# Patient Record
Sex: FEMALE | Race: BLACK OR AFRICAN AMERICAN | NOT HISPANIC OR LATINO | Employment: FULL TIME | URBAN - METROPOLITAN AREA
[De-identification: names, ages, dates, MRNs, and addresses within clinical notes are randomized per-mention and may not be internally consistent; named-entity substitution may affect disease eponyms.]

---

## 2024-08-01 ENCOUNTER — LAB (OUTPATIENT)
Dept: LAB | Facility: LAB | Age: 24
End: 2024-08-01
Payer: COMMERCIAL

## 2024-08-01 ENCOUNTER — APPOINTMENT (OUTPATIENT)
Dept: PRIMARY CARE | Facility: CLINIC | Age: 24
End: 2024-08-01
Payer: COMMERCIAL

## 2024-08-01 VITALS
BODY MASS INDEX: 26.93 KG/M2 | HEIGHT: 63 IN | RESPIRATION RATE: 21 BRPM | OXYGEN SATURATION: 99 % | SYSTOLIC BLOOD PRESSURE: 123 MMHG | DIASTOLIC BLOOD PRESSURE: 73 MMHG | WEIGHT: 152 LBS | HEART RATE: 100 BPM

## 2024-08-01 DIAGNOSIS — Z00.00 ROUTINE GENERAL MEDICAL EXAMINATION AT A HEALTH CARE FACILITY: Primary | ICD-10-CM

## 2024-08-01 DIAGNOSIS — Z00.00 ROUTINE GENERAL MEDICAL EXAMINATION AT A HEALTH CARE FACILITY: ICD-10-CM

## 2024-08-01 LAB
ANION GAP SERPL CALC-SCNC: 12 MMOL/L (ref 10–20)
BUN SERPL-MCNC: 14 MG/DL (ref 6–23)
CALCIUM SERPL-MCNC: 9.4 MG/DL (ref 8.6–10.3)
CHLORIDE SERPL-SCNC: 108 MMOL/L (ref 98–107)
CO2 SERPL-SCNC: 23 MMOL/L (ref 21–32)
CREAT SERPL-MCNC: 0.8 MG/DL (ref 0.5–1.05)
EGFRCR SERPLBLD CKD-EPI 2021: >90 ML/MIN/1.73M*2
ERYTHROCYTE [DISTWIDTH] IN BLOOD BY AUTOMATED COUNT: 13.3 % (ref 11.5–14.5)
GLUCOSE SERPL-MCNC: 77 MG/DL (ref 74–99)
HCT VFR BLD AUTO: 38.7 % (ref 36–46)
HGB BLD-MCNC: 12 G/DL (ref 12–16)
MCH RBC QN AUTO: 25.3 PG (ref 26–34)
MCHC RBC AUTO-ENTMCNC: 31 G/DL (ref 32–36)
MCV RBC AUTO: 82 FL (ref 80–100)
NRBC BLD-RTO: 0 /100 WBCS (ref 0–0)
PLATELET # BLD AUTO: 333 X10*3/UL (ref 150–450)
POTASSIUM SERPL-SCNC: 4.3 MMOL/L (ref 3.5–5.3)
RBC # BLD AUTO: 4.75 X10*6/UL (ref 4–5.2)
SODIUM SERPL-SCNC: 139 MMOL/L (ref 136–145)
TSH SERPL-ACNC: 1.95 MIU/L (ref 0.44–3.98)
WBC # BLD AUTO: 8.8 X10*3/UL (ref 4.4–11.3)

## 2024-08-01 PROCEDURE — 85027 COMPLETE CBC AUTOMATED: CPT

## 2024-08-01 PROCEDURE — 99385 PREV VISIT NEW AGE 18-39: CPT | Performed by: STUDENT IN AN ORGANIZED HEALTH CARE EDUCATION/TRAINING PROGRAM

## 2024-08-01 PROCEDURE — 36415 COLL VENOUS BLD VENIPUNCTURE: CPT

## 2024-08-01 PROCEDURE — 80048 BASIC METABOLIC PNL TOTAL CA: CPT

## 2024-08-01 PROCEDURE — 3008F BODY MASS INDEX DOCD: CPT | Performed by: STUDENT IN AN ORGANIZED HEALTH CARE EDUCATION/TRAINING PROGRAM

## 2024-08-01 PROCEDURE — 1036F TOBACCO NON-USER: CPT | Performed by: STUDENT IN AN ORGANIZED HEALTH CARE EDUCATION/TRAINING PROGRAM

## 2024-08-01 PROCEDURE — 84443 ASSAY THYROID STIM HORMONE: CPT

## 2024-08-01 ASSESSMENT — PATIENT HEALTH QUESTIONNAIRE - PHQ9
2. FEELING DOWN, DEPRESSED OR HOPELESS: NOT AT ALL
SUM OF ALL RESPONSES TO PHQ9 QUESTIONS 1 AND 2: 0
1. LITTLE INTEREST OR PLEASURE IN DOING THINGS: NOT AT ALL

## 2024-08-01 ASSESSMENT — ENCOUNTER SYMPTOMS
OCCASIONAL FEELINGS OF UNSTEADINESS: 0
DEPRESSION: 0
LOSS OF SENSATION IN FEET: 0

## 2024-08-01 NOTE — PROGRESS NOTES
"  Subjective     Patient ID: Delilah Ovalles is a 24 y.o. female who presents for New Patient Visit.      Last Physical : ___Years ago     Pt's PMH, PSH, SH, FH , meds and allergies was obtained / reviewed and updated .     Dental visits : Y  Vision issues : N  Hearing issues : N    Immunizations : Y    Diet :    Exercise:  Weight concerns :     Alcohol: as noted in   Tobacco: as noted in   Recreational drug use : None/ as noted in     ======================================================    Visit Vitals  /73   Pulse 100   Resp 21   Ht 1.6 m (5' 3\")   Wt 68.9 kg (152 lb)   SpO2 99%   BMI 26.93 kg/m²   Smoking Status Never   BSA 1.75 m²      No LMP recorded.   No current outpatient medications   Social History     Tobacco Use    Smoking status: Never    Smokeless tobacco: Never   Substance Use Topics    Alcohol use: Yes     Comment: social        =====================================    Review of systems:  Constitutional: no chills, no fever and no night sweats.     Eyes: no blurred vision and no eyesight problems.     ENT: no hearing loss, no nasal congestion, no nasal discharge, no hoarseness and no sore throat.     Cardiovascular: no chest pain, no intermittent leg claudication, no lower extremity edema, no palpitations and no syncope.     Respiratory: no cough, no shortness of breath during exertion, no shortness of breath at rest and no wheezing.     Gastrointestinal: no abdominal pain, no blood in stools, no constipation, no diarrhea, no melena, no nausea, no rectal pain and no vomiting.     Genitourinary: no dysuria, no change in urinary frequency, no urinary hesitancy, no feelings of urinary urgency and no vaginal discharge.     Musculoskeletal: no arthralgias, no back pain and no myalgias.     Integumentary: no new skin lesions and no rashes.     Neurological: no difficulty walking, no headache, no limb weakness, no numbness and no tingling.     Psychiatric: no anxiety, no depression, no anhedonia " and no substance use disorders.   ============================================================    Physical exam :    Constitutional: Alert and in no acute distress. Well developed, well nourished.     Eyes: Normal external exam. Pupils were equal in size, round, reactive to light (PERRL) with normal accommodation and extraocular movements intact (EOMI).     Ears, Nose, Mouth, and Throat: External inspection of ears and nose: Normal.  Otoscopic examination: Normal.      Neck: No neck mass was observed. Supple.     Cardiovascular: Heart rate and rhythm were normal, normal S1 and S2, no gallops, no murmurs and no pericardial rub    Pulmonary: No respiratory distress. Clear bilateral breath sounds.     Abdomen: Soft nontender; no abdominal mass palpated. No organomegaly.     Musculoskeletal: No joint swelling seen, normal movements of all extremities. Range of motion: Normal.  Muscle strength/tone: Normal.      Neurologic: Deep tendon reflexes were 2+ and symmetric. Sensation: Normal.     Psychiatric: Judgment and insight: Intact. Mood and affect: Normal.        Assessment/Plan    Diagnoses and all orders for this visit:  Routine general medical examination at a health care facility  -     CBC; Future  -     Basic Metabolic Panel; Future  -     TSH with reflex to Free T4 if abnormal; Future        Drink atleast 64 oz of fluid everyday .   Include fiber in the diet  in the form of vegetables . Half of your plate should include vegetables         HM :   Vaccines:  -Tdap : last received 11/2021  -Flu :    -Shingles : at age 49     Cancer screenings:  -PAP :  to fu with gyn     General preventive care discussed which includes regular exercise, healthy eating habits, to include more of plant based diet, to include foods of all colors  , limiting excessive red meat intake , staying active to maintain a weight that is appropriate for his/ her height / making efforts to reach an ideal weight for height,  avoidance of smoking,  excess alcohol consumption, avoidance of recreational drugs, safe and responsible sexual relationships and practices.     Calcium + Vit D supplements recommended   Regular exercise recommended   Healthy eating choices discussed and recommended       Conditions addressed and mgmt as noted above.  Pertinent labs, images/ imaging reports , chart review was done .   Age appropriate labs / labs for mgmt of chronic medical conditions ordered, further mgmt pending the results.       This note is intended for the physician writing it, as well as to communicate findings to other healthcare professionals. These notes use medical lexicon that may be misunderstood by non medical persons. Therefore, interpretations of medical notes and terminology should be approached with caution.

## 2024-08-02 ENCOUNTER — LAB (OUTPATIENT)
Dept: LAB | Facility: LAB | Age: 24
End: 2024-08-02
Payer: COMMERCIAL

## 2024-08-02 ENCOUNTER — OFFICE VISIT (OUTPATIENT)
Dept: OBSTETRICS AND GYNECOLOGY | Facility: HOSPITAL | Age: 24
End: 2024-08-02
Payer: COMMERCIAL

## 2024-08-02 VITALS
BODY MASS INDEX: 27.11 KG/M2 | WEIGHT: 153 LBS | DIASTOLIC BLOOD PRESSURE: 82 MMHG | HEIGHT: 63 IN | SYSTOLIC BLOOD PRESSURE: 134 MMHG

## 2024-08-02 DIAGNOSIS — Z01.419 WELL WOMAN EXAM WITH ROUTINE GYNECOLOGICAL EXAM: Primary | ICD-10-CM

## 2024-08-02 DIAGNOSIS — Z01.419 WELL WOMAN EXAM WITH ROUTINE GYNECOLOGICAL EXAM: ICD-10-CM

## 2024-08-02 LAB
HBV SURFACE AG SERPL QL IA: NONREACTIVE
HCV AB SER QL: NONREACTIVE
HIV 1+2 AB+HIV1 P24 AG SERPL QL IA: NONREACTIVE
TREPONEMA PALLIDUM IGG+IGM AB [PRESENCE] IN SERUM OR PLASMA BY IMMUNOASSAY: NONREACTIVE

## 2024-08-02 PROCEDURE — 99385 PREV VISIT NEW AGE 18-39: CPT | Performed by: NURSE PRACTITIONER

## 2024-08-02 PROCEDURE — 86803 HEPATITIS C AB TEST: CPT

## 2024-08-02 PROCEDURE — 87340 HEPATITIS B SURFACE AG IA: CPT

## 2024-08-02 PROCEDURE — 87389 HIV-1 AG W/HIV-1&-2 AB AG IA: CPT

## 2024-08-02 PROCEDURE — 1036F TOBACCO NON-USER: CPT | Performed by: NURSE PRACTITIONER

## 2024-08-02 PROCEDURE — 86780 TREPONEMA PALLIDUM: CPT

## 2024-08-02 PROCEDURE — 36415 COLL VENOUS BLD VENIPUNCTURE: CPT

## 2024-08-02 PROCEDURE — 3008F BODY MASS INDEX DOCD: CPT | Performed by: NURSE PRACTITIONER

## 2024-08-02 SDOH — ECONOMIC STABILITY: FOOD INSECURITY: WITHIN THE PAST 12 MONTHS, THE FOOD YOU BOUGHT JUST DIDN'T LAST AND YOU DIDN'T HAVE MONEY TO GET MORE.: NEVER TRUE

## 2024-08-02 SDOH — ECONOMIC STABILITY: TRANSPORTATION INSECURITY
IN THE PAST 12 MONTHS, HAS THE LACK OF TRANSPORTATION KEPT YOU FROM MEDICAL APPOINTMENTS OR FROM GETTING MEDICATIONS?: NO

## 2024-08-02 SDOH — ECONOMIC STABILITY: FOOD INSECURITY: WITHIN THE PAST 12 MONTHS, YOU WORRIED THAT YOUR FOOD WOULD RUN OUT BEFORE YOU GOT MONEY TO BUY MORE.: NEVER TRUE

## 2024-08-02 SDOH — ECONOMIC STABILITY: TRANSPORTATION INSECURITY
IN THE PAST 12 MONTHS, HAS LACK OF TRANSPORTATION KEPT YOU FROM MEETINGS, WORK, OR FROM GETTING THINGS NEEDED FOR DAILY LIVING?: NO

## 2024-08-02 ASSESSMENT — PATIENT HEALTH QUESTIONNAIRE - PHQ9
1. LITTLE INTEREST OR PLEASURE IN DOING THINGS: NOT AT ALL
2. FEELING DOWN, DEPRESSED OR HOPELESS: NOT AT ALL
SUM OF ALL RESPONSES TO PHQ9 QUESTIONS 1 & 2: 0

## 2024-08-02 ASSESSMENT — ENCOUNTER SYMPTOMS
OCCASIONAL FEELINGS OF UNSTEADINESS: 0
DEPRESSION: 0
LOSS OF SENSATION IN FEET: 0

## 2024-08-02 ASSESSMENT — PAIN SCALES - GENERAL: PAINLEVEL: 0-NO PAIN

## 2024-08-05 LAB
C TRACH RRNA SPEC QL NAA+PROBE: NEGATIVE
N GONORRHOEA DNA SPEC QL PROBE+SIG AMP: NEGATIVE
T VAGINALIS RRNA SPEC QL NAA+PROBE: NEGATIVE

## 2024-08-08 LAB
CYTOLOGY CMNT CVX/VAG CYTO-IMP: NORMAL
LAB AP HPV HR: NORMAL
LAB AP PAP ADDITIONAL TESTS: NORMAL
LABORATORY COMMENT REPORT: NORMAL
LABORATORY COMMENT REPORT: NORMAL
LMP START DATE: NORMAL
PATH REPORT.TOTAL CANCER: NORMAL

## 2025-02-12 ENCOUNTER — OFFICE VISIT (OUTPATIENT)
Dept: OBSTETRICS AND GYNECOLOGY | Facility: HOSPITAL | Age: 25
End: 2025-02-12
Payer: COMMERCIAL

## 2025-02-12 VITALS
WEIGHT: 153 LBS | SYSTOLIC BLOOD PRESSURE: 124 MMHG | HEIGHT: 63 IN | HEART RATE: 101 BPM | DIASTOLIC BLOOD PRESSURE: 84 MMHG | BODY MASS INDEX: 27.11 KG/M2

## 2025-02-12 DIAGNOSIS — N89.8 VAGINAL DISCHARGE: Primary | ICD-10-CM

## 2025-02-12 DIAGNOSIS — N30.01 ACUTE CYSTITIS WITH HEMATURIA: ICD-10-CM

## 2025-02-12 PROBLEM — Z34.92 ENCOUNTER FOR PREGNANCY RELATED EXAMINATION IN SECOND TRIMESTER: Status: ACTIVE | Noted: 2025-02-12

## 2025-02-12 LAB
POC APPEARANCE, URINE: CLEAR
POC BILIRUBIN, URINE: NEGATIVE
POC BLOOD, URINE: NEGATIVE
POC COLOR, URINE: YELLOW
POC GLUCOSE, URINE: NEGATIVE MG/DL
POC KETONES, URINE: NEGATIVE MG/DL
POC LEUKOCYTES, URINE: NEGATIVE
POC NITRITE,URINE: POSITIVE
POC PH, URINE: 6 PH
POC PROTEIN, URINE: ABNORMAL MG/DL
POC SPECIFIC GRAVITY, URINE: >=1.03
POC UROBILINOGEN, URINE: 1 EU/DL

## 2025-02-12 PROCEDURE — 99214 OFFICE O/P EST MOD 30 MIN: CPT | Performed by: NURSE PRACTITIONER

## 2025-02-12 PROCEDURE — 1036F TOBACCO NON-USER: CPT | Performed by: NURSE PRACTITIONER

## 2025-02-12 PROCEDURE — 3008F BODY MASS INDEX DOCD: CPT | Performed by: NURSE PRACTITIONER

## 2025-02-12 PROCEDURE — 81003 URINALYSIS AUTO W/O SCOPE: CPT | Performed by: NURSE PRACTITIONER

## 2025-02-12 RX ORDER — SULFAMETHOXAZOLE AND TRIMETHOPRIM 800; 160 MG/1; MG/1
1 TABLET ORAL 2 TIMES DAILY
Qty: 10 TABLET | Refills: 0 | Status: SHIPPED | OUTPATIENT
Start: 2025-02-12 | End: 2025-02-17

## 2025-02-12 SDOH — ECONOMIC STABILITY: FOOD INSECURITY: WITHIN THE PAST 12 MONTHS, YOU WORRIED THAT YOUR FOOD WOULD RUN OUT BEFORE YOU GOT MONEY TO BUY MORE.: NEVER TRUE

## 2025-02-12 SDOH — ECONOMIC STABILITY: FOOD INSECURITY: WITHIN THE PAST 12 MONTHS, THE FOOD YOU BOUGHT JUST DIDN'T LAST AND YOU DIDN'T HAVE MONEY TO GET MORE.: NEVER TRUE

## 2025-02-12 ASSESSMENT — PATIENT HEALTH QUESTIONNAIRE - PHQ9
2. FEELING DOWN, DEPRESSED OR HOPELESS: NOT AT ALL
1. LITTLE INTEREST OR PLEASURE IN DOING THINGS: NOT AT ALL
SUM OF ALL RESPONSES TO PHQ9 QUESTIONS 1 & 2: 0

## 2025-02-12 ASSESSMENT — ENCOUNTER SYMPTOMS: FREQUENCY: 1

## 2025-02-12 ASSESSMENT — PAIN SCALES - GENERAL: PAINLEVEL_OUTOF10: 0-NO PAIN

## 2025-02-12 NOTE — PROGRESS NOTES
Subjective   Patient ID: Delilah Ovalles is a 24 y.o. female who presents for Vaginitis/Bacterial Vaginosis (Pt has recurring BV/Possible UTI/Last pap 2024 neg/Declines STI testing/Pt declines chaperone/[facility] MA).  HPI  Patient is a 24-year-old  here today complaining of a foul smell to her urine along with darkened color.  Denies any dysuria.  Reports she has had urinary tract infection before and feels like this is the same.    Also believes she has bacterial vaginosis.    Also reports she still is trying for pregnancy although does not track her periods and focusing sex at ovulation time.  Review of Systems   Genitourinary:  Positive for frequency and vaginal discharge.   All other systems reviewed and are negative.      Objective   Physical Exam  Constitutional:       Appearance: She is normal weight.   Pulmonary:      Effort: Pulmonary effort is normal.   Genitourinary:     General: Normal vulva.      Labia:         Right: No rash or tenderness.         Left: No rash or tenderness.       Vagina: Vaginal discharge present.   Musculoskeletal:      Cervical back: Normal range of motion.   Neurological:      Mental Status: She is alert.   Psychiatric:         Mood and Affect: Mood normal.         Assessment/Plan   Problem List Items Addressed This Visit             ICD-10-CM       Ob-Gyn Problems    Encounter for pregnancy related examination in second trimester Z34.92    Relevant Medications    sulfamethoxazole-trimethoprim (Bactrim DS) 800-160 mg tablet     Other Visit Diagnoses         Codes    Vaginal discharge    -  Primary N89.8    Relevant Orders    Vaginitis Gram Stain For Bacterial Vaginosis + Yeast        Reviewed tracking.  And focusing intercourse at ovulatory time.  Treating apparent urinary tract infection with culture pending.  Will wait for vaginitis results before treating.         Kristen Dill, SERENITY-CNP 25 10:05 AM

## 2025-02-15 LAB
BACTERIA UR CULT: ABNORMAL
BV SCORE VAG QL: NORMAL

## 2025-04-17 ENCOUNTER — OFFICE VISIT (OUTPATIENT)
Dept: URGENT CARE | Age: 25
End: 2025-04-17
Payer: COMMERCIAL

## 2025-04-17 VITALS
TEMPERATURE: 98.1 F | HEART RATE: 83 BPM | BODY MASS INDEX: 26.58 KG/M2 | DIASTOLIC BLOOD PRESSURE: 87 MMHG | HEIGHT: 63 IN | SYSTOLIC BLOOD PRESSURE: 133 MMHG | RESPIRATION RATE: 18 BRPM | OXYGEN SATURATION: 96 % | WEIGHT: 150 LBS

## 2025-04-17 DIAGNOSIS — N76.0 BACTERIAL VAGINOSIS: ICD-10-CM

## 2025-04-17 DIAGNOSIS — N89.8 VAGINAL IRRITATION: ICD-10-CM

## 2025-04-17 DIAGNOSIS — R35.0 FREQUENT URINATION: ICD-10-CM

## 2025-04-17 DIAGNOSIS — R30.0 DYSURIA: Primary | ICD-10-CM

## 2025-04-17 DIAGNOSIS — B96.89 BACTERIAL VAGINOSIS: ICD-10-CM

## 2025-04-17 LAB
POC APPEARANCE, URINE: CLEAR
POC BACTERIAL VAGINITIS (RAPID): POSITIVE
POC BILIRUBIN, URINE: ABNORMAL
POC BLOOD, URINE: ABNORMAL
POC COLOR, URINE: YELLOW
POC GLUCOSE, URINE: ABNORMAL MG/DL
POC KETONES, URINE: NEGATIVE MG/DL
POC LEUKOCYTES, URINE: NEGATIVE
POC NITRITE,URINE: NEGATIVE
POC PH, URINE: 6.5 PH
POC PROTEIN, URINE: ABNORMAL MG/DL
POC SPECIFIC GRAVITY, URINE: 1.02
POC UROBILINOGEN, URINE: 0.2 EU/DL
PREGNANCY TEST URINE, POC: NEGATIVE

## 2025-04-17 RX ORDER — METRONIDAZOLE 7.5 MG/G
GEL VAGINAL 2 TIMES DAILY
Qty: 70 G | Refills: 0 | Status: SHIPPED | OUTPATIENT
Start: 2025-04-17 | End: 2025-04-22

## 2025-04-17 ASSESSMENT — ENCOUNTER SYMPTOMS: DYSURIA: 1

## 2025-04-17 NOTE — PROGRESS NOTES
"Subjective   Patient ID: Delilah Ovalles is a 24 y.o. female. They present today with a chief complaint of UTI (Frequency and burning with urination. Symptoms for a week.  ).    History of Present Illness    UTI      Past Medical History  Allergies as of 04/17/2025    (No Known Allergies)       Prescriptions Prior to Admission[1]     Medical History[2]    Surgical History[3]     reports that she has never smoked. She has never been exposed to tobacco smoke. She has never used smokeless tobacco. She reports current alcohol use. She reports that she does not use drugs.    Review of Systems  Review of Systems   Genitourinary:  Positive for dysuria and urgency.   All other systems reviewed and are negative.                                 Objective    Vitals:    04/17/25 1204   BP: 133/87   BP Location: Left arm   Patient Position: Sitting   BP Cuff Size: Adult   Pulse: 83   Resp: 18   Temp: 36.7 °C (98.1 °F)   TempSrc: Oral   SpO2: 96%   Weight: 68 kg (150 lb)   Height: 1.6 m (5' 3\")     Patient's last menstrual period was 04/07/2025 (approximate).    Physical Exam  Constitutional:       Appearance: Normal appearance.   HENT:      Head: Normocephalic.      Nose: Nose normal.      Mouth/Throat:      Mouth: Mucous membranes are dry.      Pharynx: Oropharynx is clear.   Eyes:      Extraocular Movements: Extraocular movements intact.      Pupils: Pupils are equal, round, and reactive to light.   Pulmonary:      Effort: Pulmonary effort is normal.   Abdominal:      General: There is no distension.      Tenderness: There is no abdominal tenderness. There is no right CVA tenderness or left CVA tenderness.   Musculoskeletal:         General: Normal range of motion.      Cervical back: Normal range of motion and neck supple.   Lymphadenopathy:      Cervical: No cervical adenopathy.   Skin:     General: Skin is warm and dry.   Neurological:      General: No focal deficit present.      Mental Status: She is alert and oriented to " person, place, and time.   Psychiatric:         Mood and Affect: Mood normal.         Behavior: Behavior normal.       Procedures    Point of Care Test & Imaging Results from this visit  Results for orders placed or performed in visit on 04/17/25   POCT UA Automated manually resulted   Result Value Ref Range    POC Color, Urine Yellow Straw, Yellow, Light-Yellow    POC Appearance, Urine Clear Clear    POC Glucose, Urine 500 (3+) (A) NEGATIVE mg/dl    POC Bilirubin, Urine SMALL (1+) (A) NEGATIVE    POC Ketones, Urine NEGATIVE NEGATIVE mg/dl    POC Specific Gravity, Urine 1.025 1.005 - 1.035    POC Blood, Urine SMALL (1+) (A) NEGATIVE    POC PH, Urine 6.5 No Reference Range Established PH    POC Protein, Urine TRACE (A) NEGATIVE mg/dl    POC Urobilinogen, Urine 0.2 0.2, 1.0 EU/DL    Poc Nitrite, Urine NEGATIVE NEGATIVE    POC Leukocytes, Urine NEGATIVE NEGATIVE   POCT pregnancy, urine manually resulted   Result Value Ref Range    Preg Test, Ur Negative Negative      Imaging  No results found.    Cardiology, Vascular, and Other Imaging  No other imaging results found for the past 2 days      Diagnostic study results (if any) were reviewed by SYLVESTER Rhoades.    Assessment/Plan   Allergies, medications, history, and pertinent labs/EKGs/Imaging reviewed by SYLVESTER Rhoades.     Medical Decision Making  25 y/o F presents with UTI s/s, pt was treated 2/12/25 for UTI with bactrim. Pt had s/s for 5 days with vaginal irritation.,    UA- trace protein and blood  HCG- NEG  Urine culture-PENDING  Rapid BV-POSITIVE; metrogel prescribed  Advised to refrain from sex, no hot tubs, no douching  F/u OB/GN  If urine culture is + will treat accordingly  If s/s worsen, go to ER        Follow up Care: Pt instructed to follow-up with PCP or other appropriate clinician within 24 to 48 hours. Report to ED if there is any development in worsening pain, difficulty swallowing, change in phonation, fever, chills, neck pain,  photophobia, headache, neck stiffness, chest pain, abdominal pain, vomiting, syncope, hemoptysis, leg swelling SOB, fever, facial swelling, eye pain, periorbital swelling/erythema, or any new signs or sx.     The patient was educated regarding diagnosis, supportive care, OTC and Rx medications. The patient was given the opportunity to ask questions prior to discharge. They verbalized understanding of my discussion of the plans for treatment, expected course, indications to return to  or seek further evaluation in ED, and the need for timely follow up as directed.     Take all the medicine you were prescribed, even if you feel better. Not finishing the medicine can make the infection come back. It may also make a future infection harder to treat.  Unless told not to by your healthcare provider, drink 8 to 12 glasses of fluid every day. Clear fluids, such as water, are best. This may help flush the infection from your system.  Please consider D-mannose supplementation if you continue to get recurrent UTIs as it may help prevent against gram negative bacteria such as E. coli  Preventing future infection  Keep your genital area clean. Use mild soap. Rinse with water.  If you are a woman, always wipe the genital area from front to back.  Urinate frequently. Don't hold urine in your bladder for a long time.  Always urinate after having sex.  Practice safe sex. Protect yourself and your partner from sexually transmitted infections (STIs).    Follow-up care  Follow up with your healthcare provider, or as advised. And see your healthcare provider for regular lab tests as directed.      Call 911 or go to ER if you have    Decreased urine output or trouble urinating  Severe pain in the lower back or flank  Fever of 100.4°F (38°C) or higher, or as directed by your healthcare provider  Shaking chills  Vomiting  Blood in your urine  Dark-colored or foul-smelling urine  Nausea or other problems that prevent you from taking your  prescribed medicine  New or worsening symptoms      Orders and Diagnoses  Diagnoses and all orders for this visit:  Dysuria  -     POCT pregnancy, urine manually resulted  -     Urine Culture  -     POCT BV Blue Rapid - Bacterial Vaginitis manually resulted  Frequent urination  -     POCT UA Automated manually resulted  -     POCT pregnancy, urine manually resulted  -     Urine Culture  Vaginal irritation  -     POCT BV Blue Rapid - Bacterial Vaginitis manually resulted  Bacterial vaginosis  -     metroNIDAZOLE (Metrogel) 0.75 % (37.5mg/5 gram) vaginal gel; Insert into the vagina 2 times a day for 5 days.      Medical Admin Record      Patient disposition: Home    Electronically signed by SYLVESTER Rhoades  12:42 PM           [1] (Not in a hospital admission)   [2]   Past Medical History:  Diagnosis Date    Acute vaginitis 09/03/2021    Acute vaginitis    Acute vaginitis 06/29/2021    Bacterial vaginosis    Chlamydial infection, unspecified 08/27/2020    Chlamydial infection    Personal history of other diseases of the respiratory system 08/26/2016    History of extrinsic asthma    Personal history of other infectious and parasitic diseases 01/25/2022    History of candidiasis of vagina    Personal history of other infectious and parasitic diseases 12/08/2021    History of trichomonal vaginitis    Personal history of other infectious and parasitic diseases 01/26/2021    History of gonorrhea    Urinary tract infection, site not specified 12/11/2021    Urinary tract infection, acute   [3] History reviewed. No pertinent surgical history.

## 2025-04-19 LAB — BACTERIA UR CULT: ABNORMAL

## 2025-05-15 ENCOUNTER — APPOINTMENT (OUTPATIENT)
Dept: PRIMARY CARE | Facility: CLINIC | Age: 25
End: 2025-05-15
Payer: COMMERCIAL

## 2025-06-19 ENCOUNTER — APPOINTMENT (OUTPATIENT)
Dept: PRIMARY CARE | Facility: CLINIC | Age: 25
End: 2025-06-19
Payer: COMMERCIAL

## 2025-07-10 ENCOUNTER — OFFICE VISIT (OUTPATIENT)
Dept: URGENT CARE | Age: 25
End: 2025-07-10
Payer: COMMERCIAL

## 2025-07-10 VITALS
WEIGHT: 150 LBS | DIASTOLIC BLOOD PRESSURE: 76 MMHG | TEMPERATURE: 98.9 F | RESPIRATION RATE: 16 BRPM | HEART RATE: 88 BPM | OXYGEN SATURATION: 98 % | SYSTOLIC BLOOD PRESSURE: 126 MMHG | BODY MASS INDEX: 26.58 KG/M2 | HEIGHT: 63 IN

## 2025-07-10 DIAGNOSIS — R35.0 FREQUENT URINATION: ICD-10-CM

## 2025-07-10 DIAGNOSIS — Z34.90 PREGNANCY, UNSPECIFIED GESTATIONAL AGE (HHS-HCC): Primary | ICD-10-CM

## 2025-07-10 LAB
POC APPEARANCE, URINE: ABNORMAL
POC BILIRUBIN, URINE: NEGATIVE
POC BLOOD, URINE: NEGATIVE
POC COLOR, URINE: YELLOW
POC GLUCOSE, URINE: NEGATIVE MG/DL
POC KETONES, URINE: NEGATIVE MG/DL
POC LEUKOCYTES, URINE: ABNORMAL
POC NITRITE,URINE: NEGATIVE
POC PH, URINE: 6.5 PH
POC PROTEIN, URINE: ABNORMAL MG/DL
POC SPECIFIC GRAVITY, URINE: 1.01
POC UROBILINOGEN, URINE: 0.2 EU/DL
PREGNANCY TEST URINE, POC: POSITIVE

## 2025-07-10 ASSESSMENT — ENCOUNTER SYMPTOMS: DYSURIA: 1

## 2025-07-10 NOTE — PROGRESS NOTES
"Subjective   Patient ID: Delilah Ovalles is a 25 y.o. female. They present today with a chief complaint of UTI (Cloudy urine, with frequency. Symptoms for a week. ).    History of Present Illness    UTI      Past Medical History  Allergies as of 07/10/2025    (No Known Allergies)       Prescriptions Prior to Admission[1]     Medical History[2]    Surgical History[3]     reports that she has never smoked. She has never been exposed to tobacco smoke. She has never used smokeless tobacco. She reports current alcohol use. She reports that she does not use drugs.    Review of Systems  Review of Systems   Genitourinary:  Positive for dysuria and urgency.   All other systems reviewed and are negative.                                 Objective    Vitals:    07/10/25 1051   BP: 126/76   BP Location: Left arm   Patient Position: Sitting   BP Cuff Size: Adult   Pulse: 88   Resp: 16   Temp: 37.2 °C (98.9 °F)   TempSrc: Oral   SpO2: 98%   Weight: 68 kg (150 lb)   Height: 1.6 m (5' 3\")     Patient's last menstrual period was 06/21/2025 (approximate).    Physical Exam  Vitals and nursing note reviewed.   Constitutional:       Appearance: Normal appearance.   HENT:      Head: Normocephalic.      Nose: Nose normal.      Mouth/Throat:      Mouth: Mucous membranes are dry.      Pharynx: Oropharynx is clear.   Eyes:      Extraocular Movements: Extraocular movements intact.      Pupils: Pupils are equal, round, and reactive to light.   Pulmonary:      Effort: Pulmonary effort is normal.   Abdominal:      General: There is no distension.      Tenderness: There is no abdominal tenderness. There is no right CVA tenderness or left CVA tenderness.   Musculoskeletal:         General: Normal range of motion.      Cervical back: Normal range of motion and neck supple.   Lymphadenopathy:      Cervical: No cervical adenopathy.   Skin:     General: Skin is warm and dry.   Neurological:      General: No focal deficit present.      Mental Status: She " is alert and oriented to person, place, and time.   Psychiatric:         Mood and Affect: Mood normal.         Behavior: Behavior normal.       Procedures    Point of Care Test & Imaging Results from this visit  Results for orders placed or performed in visit on 07/10/25   POCT pregnancy, urine manually resulted   Result Value Ref Range    Preg Test, Ur Positive (A) Negative   POCT UA Automated manually resulted   Result Value Ref Range    POC Color, Urine Yellow Straw, Yellow, Light-Yellow    POC Appearance, Urine Cloudy (A) Clear    POC Glucose, Urine NEGATIVE NEGATIVE mg/dl    POC Bilirubin, Urine NEGATIVE NEGATIVE    POC Ketones, Urine NEGATIVE NEGATIVE mg/dl    POC Specific Gravity, Urine 1.015 1.005 - 1.035    POC Blood, Urine NEGATIVE NEGATIVE    POC PH, Urine 6.5 No Reference Range Established PH    POC Protein, Urine TRACE (A) NEGATIVE mg/dl    POC Urobilinogen, Urine 0.2 0.2, 1.0 EU/DL    Poc Nitrite, Urine NEGATIVE NEGATIVE    POC Leukocytes, Urine SMALL (1+) (A) NEGATIVE      Imaging  No results found.    Cardiology, Vascular, and Other Imaging  No other imaging results found for the past 2 days      Diagnostic study results (if any) were reviewed by SYLVESTER Rhoades.    Assessment/Plan   Allergies, medications, history, and pertinent labs/EKGs/Imaging reviewed by SYLVESTER Rhoades.     Medical Decision Making  26 y/o F presents with urinary urgency and frequency x 1 week, LMP 6/14/25. Denies back pain and fever or ab pain. Patient denies headache, lightheadedness, dizziness, extremity weakness, numbness or tingling, chest pain, orthopnea, shortness of breath, wheezing, cough, abdominal pain, vomiting, diarrhea, constipation, or urinary symptoms.     Declined STI testing  UA- +leuks, protein  PCR- PEND  HCG- POSITIVE    Will wait to treat if true UTI d/t +HCG! Information given to pt regarding care, and [place] [facility] [place]  Refrain from sexual activity  Pelvic  rest  Cranberry supplementation daily  F/u PCP 1 week   If s/s worsen go to ER    At time of discharge, patient was clinically well-appearing and appropriate for outpatient management. The patient/parent/guardian was educated regarding diagnosis, supportive care, OTC and Rx medications. The patient/parent/guardian was given the opportunity to ask questions prior to discharge. They verbalized understanding of discussion of treatment plan, expected course of illness and/or injury, indications on when to return to , when to seek further evaluation in ED/call 911, and the need to follow up with PCP and/or specialist as referred. Patient/parent/guardian was provided with work/school documentation if requested. Patient stable upon discharge     Follow up Care: Pt instructed to follow-up with PCP or other appropriate clinician within 24 to 48 hours. Report to ED if there is any development in worsening pain, difficulty swallowing, change in phonation, fever, chills, neck pain, photophobia, headache, neck stiffness, chest pain, abdominal pain, vomiting, syncope, hemoptysis, leg swelling SOB, fever, facial swelling, eye pain, periorbital swelling/erythema, or any new signs or sx.     The patient was educated regarding diagnosis, supportive care, OTC and Rx medications. The patient was given the opportunity to ask questions prior to discharge. They verbalized understanding of my discussion of the plans for treatment, expected course, indications to return to  or seek further evaluation in ED, and the need for timely follow up as directed.       Take all the medicine you were prescribed, even if you feel better. Not finishing the medicine can make the infection come back. It may also make a future infection harder to treat.  Unless told not to by your healthcare provider, drink 8 to 12 glasses of fluid every day. Clear fluids, such as water, are best. This may help flush the infection from your system.  Please consider  D-mannose supplementation if you continue to get recurrent UTIs as it may help prevent against gram negative bacteria such as E. coli  Preventing future infection  Keep your genital area clean. Use mild soap. Rinse with water.  If you are a woman, always wipe the genital area from front to back.  Urinate frequently. Don't hold urine in your bladder for a long time.  Always urinate after having sex.  Practice safe sex. Protect yourself and your partner from sexually transmitted infections (STIs).    Follow-up care  Follow up with your healthcare provider, or as advised. And see your healthcare provider for regular lab tests as directed.      Call 911 or go to ER if you have    Decreased urine output or trouble urinating  Severe pain in the lower back or flank  Fever of 100.4°F (38°C) or higher, or as directed by your healthcare provider  Shaking chills  Vomiting  Blood in your urine  Dark-colored or foul-smelling urine  Nausea or other problems that prevent you from taking your prescribed medicine  New or worsening symptoms      Orders and Diagnoses  Diagnoses and all orders for this visit:  Pregnancy, unspecified gestational age ([facility])  -     Referral to Obstetrics; Future  -     Urine Culture  Frequent urination  -     POCT pregnancy, urine manually resulted  -     POCT UA Automated manually resulted  -     Urine Culture      Medical Admin Record      Patient disposition: Home    Electronically signed by SYLVESTER Rhoades  1:14 PM             [1] (Not in a hospital admission)   [2]   Past Medical History:  Diagnosis Date    Acute vaginitis 09/03/2021    Acute vaginitis    Acute vaginitis 06/29/2021    Bacterial vaginosis    Chlamydial infection, unspecified 08/27/2020    Chlamydial infection    Personal history of other diseases of the respiratory system 08/26/2016    History of extrinsic asthma    Personal history of other infectious and parasitic diseases 01/25/2022    History of candidiasis of vagina     Personal history of other infectious and parasitic diseases 12/08/2021    History of trichomonal vaginitis    Personal history of other infectious and parasitic diseases 01/26/2021    History of gonorrhea    Urinary tract infection, site not specified 12/11/2021    Urinary tract infection, acute   [3] No past surgical history on file.

## 2025-07-12 LAB — BACTERIA UR CULT: NORMAL

## 2025-07-18 ENCOUNTER — INITIAL PRENATAL (OUTPATIENT)
Dept: OBSTETRICS AND GYNECOLOGY | Facility: CLINIC | Age: 25
End: 2025-07-18
Payer: COMMERCIAL

## 2025-07-18 VITALS
SYSTOLIC BLOOD PRESSURE: 136 MMHG | BODY MASS INDEX: 27.35 KG/M2 | WEIGHT: 154.4 LBS | DIASTOLIC BLOOD PRESSURE: 81 MMHG | HEART RATE: 118 BPM

## 2025-07-18 DIAGNOSIS — Z34.01 PRENATAL CARE, FIRST PREGNANCY IN FIRST TRIMESTER: Primary | ICD-10-CM

## 2025-07-18 PROCEDURE — 0500F INITIAL PRENATAL CARE VISIT: CPT

## 2025-07-18 ASSESSMENT — ENCOUNTER SYMPTOMS
HEMATOLOGIC/LYMPHATIC NEGATIVE: 0
NEUROLOGICAL NEGATIVE: 0
PSYCHIATRIC NEGATIVE: 0
RESPIRATORY NEGATIVE: 0
GASTROINTESTINAL NEGATIVE: 0
ALLERGIC/IMMUNOLOGIC NEGATIVE: 0
MUSCULOSKELETAL NEGATIVE: 0
CONSTITUTIONAL NEGATIVE: 0
ENDOCRINE NEGATIVE: 0
EYES NEGATIVE: 0
CARDIOVASCULAR NEGATIVE: 0

## 2025-07-18 ASSESSMENT — EDINBURGH POSTNATAL DEPRESSION SCALE (EPDS)
TOTAL SCORE: 5
I HAVE LOOKED FORWARD WITH ENJOYMENT TO THINGS: AS MUCH AS I EVER DID
THINGS HAVE BEEN GETTING ON TOP OF ME: NO, MOST OF THE TIME I HAVE COPED QUITE WELL
I HAVE BLAMED MYSELF UNNECESSARILY WHEN THINGS WENT WRONG: NOT VERY OFTEN
I HAVE BEEN SO UNHAPPY THAT I HAVE HAD DIFFICULTY SLEEPING: NOT AT ALL
I HAVE BEEN SO UNHAPPY THAT I HAVE BEEN CRYING: NO, NEVER
I HAVE BEEN ABLE TO LAUGH AND SEE THE FUNNY SIDE OF THINGS: AS MUCH AS I ALWAYS COULD
I HAVE BEEN ANXIOUS OR WORRIED FOR NO GOOD REASON: YES, SOMETIMES
I HAVE FELT SCARED OR PANICKY FOR NO GOOD REASON: NO, NOT MUCH
THE THOUGHT OF HARMING MYSELF HAS OCCURRED TO ME: NEVER
I HAVE FELT SAD OR MISERABLE: NO, NOT AT ALL

## 2025-07-18 ASSESSMENT — PAIN - FUNCTIONAL ASSESSMENT: PAIN_FUNCTIONAL_ASSESSMENT: 0-10

## 2025-07-18 ASSESSMENT — PAIN SCALES - GENERAL: PAINLEVEL_OUTOF10: 0 - NO PAIN

## 2025-07-18 NOTE — PROGRESS NOTES
Cassie   Delilah Ovalles is a 25 y.o.  at 6w2d with a working estimated date of delivery of 3/11/2026, by Last Menstrual Period who presents for an initial prenatal visit. This pregnancy is unplanned- patient reports that she is nervous.    Patient currently experiencing: none  Bleeding or cramping since LMP: yes - patient reports a little spotting with wiping and occasional cramping   Taking prenatal vitamin: No  Other concerns today:  Ultrasound completed this pregnancy: No  Last pap: - WNL    Prior pregnancy complications: NA  History of hypertension:  No  OB History    Para Term  AB Living   1 0 0 0 0 0   SAB IAB Ectopic Multiple Live Births   0 0 0 0 0      # Outcome Date GA Lbr Brandyn/2nd Weight Sex Type Anes PTL Lv   1 Current                  Medical History[1]   Surgical History[2]   Social History[3]   [place]  Depression Scale Total: 5    Objective   Physical Exam  Weight: 70 kg (154 lb 6.4 oz)  TWG: Not found.   Pregravid BMI: Could not be calculated  BP: 136/81      General:   Alert and oriented x 3   Heart:  Lungs: Regular rate, rhythm  Clear to auscultation bilaterally   Thyroid: Euthyroid, normal shape and size   Breast: Symmetrical, no skin changes/nipple discharge, redness, tenderness, no masses palpated bilaterally   Abdomen: Soft, non tender   Vulva: EGBUS normal   Vagina: Pink, normal discharge   Cervix: No CMT   Uterus: Normal shape, size   Adnexa: NT bilaterally       Assessment   Problem List Items Addressed This Visit    None  Visit Diagnoses         Prenatal care, first pregnancy in first trimester    -  Primary    Relevant Orders    US MAC OB imaging order    HIV 1/2 Antigen/Antibody Screen with Reflex to Confirmation    Hepatitis C Antibody    Hemoglobin A1C    Hemoglobin Identification with Path Review    Rubella Antibody, IgG    Syphilis Screen with Reflex    Urine Culture    C. trachomatis / N. gonorrhoeae, Amplified, Urogenital    Hepatitis B Surface  Antigen    CBC Anemia Panel With Reflex,Pregnancy    Type And Screen Is this order related to pregnancy or an upcoming surgery? Yes; Where will this surgery/delivery be performed? [facility]; What is the date of the surgery? 3/11/2025; Has this patient ever had a transfusion? No; Has t...             Plan   - New OB resources provided and reviewed with particular attention to dietary, travel, and medication restrictions  - Oriented to practice, CNM vs. MD care  - Reviewed IOM recommendations for weight gain given pt's BMI: 15-25 pounds (BMI 25-29.9)  - Reviewed bleeding precautions, warning signs, when to call provider; phone number provided  - Discussed bASA for PEC prophylaxis  - Routine NOB labs ordered  - Dating and viability  ultrasound ordered  - Return in 4 weeks for routine prenatal care    SERENITY Miranda-CNM    Things to know in Pregnancy  1.     Avoidance of alcohol, tobacco and drug use  2.     Dietary restrictions reviewed including avoiding raw or poorly cooked meat, lunch meat and soft cheeses  3.    Adequate water intake.  Avoid empty calories with juices  4.    Recommendation for weight gain based on initial BMI (body mass index)  5.    Limit caffeine to less than 200-300 mg/day  6.    Take folic acid 400 mcg daily.  Incorporate 5,000u Vitamin D3 per day.  Discuss Magnesium Supplementation  7.    Importance of good sleep hygiene and avoidance of laying on back after 15 weeks  8.    Encourage daily physical activity of 30 minutes a day the majority of the days of the week  9.    Discussed normal physiologic changes:  Round ligament pain, nausea, breast tenderness  10.  Discussed natural remedies, vitamins and prescription medications for nausea  11.  Baby aspirin 162mg daily (two baby aspirin) for the reduction of pre-eclampsia during pregnancy.  Even if you have         never had any blood pressure issues, you can develop hypertension during your pregnancy.  This has been well          Studied and safe to take starting at 12 weeks gestation until after the birth of your baby.    Ultrasound and screening for aneuploidies (Down Syndrome/Trisomy 21, Trisomy 13 + 18)    A requisition has been placed for a dating ultrasound.  Please call to get that scheduled.    At this ultrasound they will ask you if your would like to proceed with screening for genetic disorders that are listed above.  They will do that with an ultrasound at approximately 13 weeks and we also draw blood work for screening which includes the fetal sex if you desire to know.    Ultrasound for anatomy will be done at 19 weeks.  Based on risk factors and any concerns the maternal fetal medicine provider has, you may need a repeat ultrasound.  Healthy pregnancies that do not have any other concerns by the midwife or maternal fetal medicine do not have any repeat ultrasounds done.      Labs:    1.     An order will be placed for your new ob labs.  Please get those done at the time of your ultrasound.  They will collect         multiple tubes of blood for new ob labs and also urine for STI testing and a urine culture.    2.     If there are any concerns with any blood work or urine testing WE WILL CALL YOU OR COMMUNICATE VIA         ividenceT!!!   The biggest concerns our patients have is when they see their complete blood count.  The reference         range in the result is for a non pregnant person!  We will notify you if there is any need to start an iron supplement.    3.    At 26-28 weeks a glucose test is ordered to see if you have gestational diabetes.  We also reassess if you have         Anemia, which can be common in pregnancy    4.    Group B strep culture will be done at 36 weeks gestation.      We also recommend that you be screened once in your life for Cystic Fibrosis and Spinal Muscular Atrophy.  This assesses if we need your partner to be tested if you are a carrier of a gene that can be passed along to your baby.  For  "this to happen, both parents must be a carrier to the gene.    Choices for care and hospital for birth:    I am a Certified Nurse Midwife and practice in a group setting, which means that any of the midwives in my group practice may be there for your birth.  We care for healthy females during pregnancy and labor/birth.  We practice in collaboration with physicians within our group.  If there are any concerns with your pregnancy, labor or birth our physicians work closely with us.      The midwives in our practice strongly encourage you to explore the option of Centering Pregnancy which has been studied for better birth outcomes!  Care will be done in a group setting with 1:1 time with a midwife and then in depth education about every stage of your pregnancy, labor/birth,  care, feeding choices, pediatrician options, birth control and coping techniques for the first few weeks after birth.  We have day groups and our [place] location and a Monday evening group at [facility].  The groups follow your normal prenatal schedule and yes, we keep in contact and I see you at the end of your pregnancy.    There are certain medical conditions that \"risks you out\" of midwifery care that we are constantly assessing for.  Some conditions during your pregnancy that would risk you out of midwifery care would be:    1.     Severe growth restriction of your baby  2.     Labor/Birth  less than 35 weeks  3.     Severe pre-eclampsia at any time during your pregnancy/labor/birth  4.     Gestational Diabetes needing medication (insulin) to control your blood sugars  5.     If you decline or do not complete your glucose testing to rule out diet controlled diabetes by 32 weeks  6.     If you are diagnosed with chronic hypertension during your pregnancy and need to start medication    The options for birth where we provide  Certified Nurse Midwifery coverage with a board certified OB/GYN, in house anesthesia and neonataology " "coverage are:    [facility] at [place], [place]    To call for questions regarding your care of if you are in labor is [phone]    My nurses name is Kathi Rees who can answer questions and keep me updated should any questions or concerns arise during your pregnancy.  After hours, the answering service will ask you where you intended to give birth and connect you to the midwife on call at [facility] or the [facility].    If you would like to tour either facility:  Please call the Childbirth education line at [phone]      Danger signs to report:    1.    Seek medical care immediately if you have pain that is doubling you over or vaginal bleeding that is heavier than a  period  2.    Notify the office should you have any burning, urgency, frequency of urination or other concerning symptoms.    Medications that are safe for common discomforts of pregnancy:    1.     Tylenol  2.     Tums or Papaya extract for any upset stomach or heartburn  3.     Zyrtec, Claritin, Benadryl for allergy symptoms  4.     Sudafed or Robitussin for cold symptoms      Work restrictions:    A normal healthy pregnancy without any complications are able to have the standard pregnancy work restrictions which is no pushing/pulling/lifting greater than 25 pounds independently    FMLA paperwork    Can be brought to the office for us to fill out for when you are starting your maternity leave (either your scheduled date of going to the hospital or your due date).  We cannot give out early FMLA when there is no documented medical conditions that are considered \"normal pregnancy\" events.    Comfort measures    1.     Chiropractors are great for alleviation of ligament pain  2.     Yoga is good for your ligaments and mentally preparing for baby and labor.  A prenatal yoga class is recommended.  3.     Prenatal massages are " fine  4.     A maternity support belt is an amazing thing that can help ligament pain -- can be purchased on Amazon  5.     Good supportive shoes are key to helping with ligament pain    Dental care    It is very important to see a dentist during your pregnancy for routine cleaning and also if you develop any dental pain during your pregnancy.  Healthy gums and teeth are very important during your pregnancy.  We can provide you with a dental letter if your dentist would like one.      **IF AT ANYTIME DURING YOUR PREGNANCY YOU HAVE CONCERNS THAT YOU CANNOT AFFORD HEALTHY FOOD PLEASE LET US KNOW!**  We have a Food for Life program and would be happy to place a referral for you.  It is so important to eat healthy during your pregnancy and we treat food as medicine.  Healthy food is expensive!  This program will allow you and your family up to 4 to receive food and recipes for one week per month.  This needs to be renewed every 6 months.      Thank you for choosing our practice and [facility] for you healthcare!  I am excited to partner with you during this very special time.     If there is anything I can do to help make your experience is positive, please come to your visits with questions and concerns and do not be afraid to ask what is on your mind!  We will see you in the office every 4 weeks until you are 30 weeks, then every 2 weeks until 36 weeks and then weekly until your baby is born.      What is a Midwife?    Midwives in the [place] provide health care services to women of all ages.Midwifemeans “with woman.”  Midwives partner with women to help make important health decisions. Midwives work with other members  of a woman’s health care team when needed, but a midwife may also be your primary care provider. There are  3 main types of midwives, and there are some differences in the services offered by each type of midwife. At [facility], our practice consists of Certified-Nurse  Midwives.    Certified Nurse-Midwives (CNMs)  Have a college degree in nursing and a master’s degree in nurse-midwifery. CNMs are registered nurses (RNs)  who have graduated from an accredited nurse-midwifery education programand passed a national certification  exam. They must have a license to practice midwifery in the state where they work. CNMs work in all health  care settings including hospitals, birth centers, and offices or clinics. CNMs provide general women’s health care  throughout a woman’s lifetime, and they can prescribe most medications.    What do midwives do?  Midwives provide care to women during pregnancy, labor, birth, and the postpartum period. They also provide general health services, annual checkups, contraception (birth control), menopausal care, and treatment for common infections and health problems. Midwives care for about one of every 10 women who give birth each year in the [place]. Most of these births are in hospitals.     Why would I choose a midwife for care during my pregnancy?  Midwives view pregnancy and birth as normal life events that can be a healthy time in your life. Midwives are experts in knowing the difference between normal changes that occur and symptoms that require extra attention. Midwives specialize in providing support and education in addition to regular health care. They use evidence-based medical procedures when there is a specific concern for the health of you or your baby, and they work in partnership with physicians who can be available if needed. Midwives offer health care that respects the goals and choices of each individual woman and family.    What if I have a high-risk pregnancy or complication during labor?  Your CNM can prescribe medicine, order tests, and provide treatment for common illnesses that you might get during pregnancy. Midwives work with physicians who specialize in complications of pregnancy. If you have a medical problem during  pregnancy or complication during labor, your midwife will work with a physician to make sure you get the best and safest care for you and your baby. Midwives do not perform surgery. If you need to have a  birth, the surgery will be done by the physician who works with your midwife. Your midwife will also work with other health care providers: nurses, social workers, nutritionists, doulas, childbirth educators, physical therapists, and other specialists to help you get the care you need.    What if I want pain medicine during labor?  Your midwife will help you make decisions about how you are going to cope with your labor. If you want medicine to cope with pain during labor, your midwife can help you get medicine that is available in the setting  where you give birth. Midwives also know other ways help women cope with labor such as changing positions or being in a tub of water. These can be helpful in addition to pain medications.                [1]   Past Medical History:  Diagnosis Date    Acute vaginitis 2021    Acute vaginitis    Acute vaginitis 2021    Bacterial vaginosis    Chlamydial infection, unspecified 2020    Chlamydial infection    Personal history of other diseases of the respiratory system 2016    History of extrinsic asthma    Personal history of other infectious and parasitic diseases 2022    History of candidiasis of vagina    Personal history of other infectious and parasitic diseases 2021    History of trichomonal vaginitis    Personal history of other infectious and parasitic diseases 2021    History of gonorrhea    Urinary tract infection, site not specified 2021    Urinary tract infection, acute   [2] No past surgical history on file.  [3]   Social History  Socioeconomic History    Marital status: Single   Tobacco Use    Smoking status: Never     Passive exposure: Never    Smokeless tobacco: Never   Vaping Use    Vaping status: Never Used    Substance and Sexual Activity    Alcohol use: Yes     Comment: social    Drug use: Never    Sexual activity: Yes     Partners: Male     Birth control/protection: Condom Male     Social Drivers of Health     Food Insecurity: No Food Insecurity (2/12/2025)    Hunger Vital Sign     Worried About Running Out of Food in the Last Year: Never true     Ran Out of Food in the Last Year: Never true   Transportation Needs: No Transportation Needs (8/2/2024)    PRAPARE - Transportation     Lack of Transportation (Medical): No     Lack of Transportation (Non-Medical): No   Stress: No Stress Concern Present (2/12/2025)    [employer] [place] [employer] - Occupational Stress Questionnaire     Feeling of Stress : Not at all   Intimate Partner Violence: Not At Risk (2/12/2025)    Humiliation, Afraid, Rape, and Kick questionnaire     Fear of Current or Ex-Partner: No     Emotionally Abused: No     Physically Abused: No     Sexually Abused: No

## 2025-07-20 LAB
BACTERIA UR CULT: NORMAL
C TRACH RRNA SPEC QL NAA+PROBE: NOT DETECTED
N GONORRHOEA RRNA SPEC QL NAA+PROBE: NOT DETECTED
QUEST GC CT AMPLIFIED (ALWAYS MESSAGE): NORMAL

## 2025-07-24 ENCOUNTER — APPOINTMENT (OUTPATIENT)
Dept: PRIMARY CARE | Facility: CLINIC | Age: 25
End: 2025-07-24
Payer: COMMERCIAL

## 2025-07-28 ENCOUNTER — HOSPITAL ENCOUNTER (OUTPATIENT)
Dept: RADIOLOGY | Facility: CLINIC | Age: 25
Discharge: HOME | End: 2025-07-28
Payer: COMMERCIAL

## 2025-07-28 DIAGNOSIS — Z34.91 UNCERTAIN DATES, ANTEPARTUM, FIRST TRIMESTER (HHS-HCC): ICD-10-CM

## 2025-07-28 DIAGNOSIS — O26.859 SPOTTING COMPLICATING PREGNANCY: ICD-10-CM

## 2025-07-28 DIAGNOSIS — Z34.01 PRENATAL CARE, FIRST PREGNANCY IN FIRST TRIMESTER: ICD-10-CM

## 2025-07-28 PROCEDURE — 76801 OB US < 14 WKS SINGLE FETUS: CPT | Performed by: STUDENT IN AN ORGANIZED HEALTH CARE EDUCATION/TRAINING PROGRAM

## 2025-07-28 PROCEDURE — 76817 TRANSVAGINAL US OBSTETRIC: CPT

## 2025-07-28 PROCEDURE — 76801 OB US < 14 WKS SINGLE FETUS: CPT

## 2025-07-28 PROCEDURE — 76817 TRANSVAGINAL US OBSTETRIC: CPT | Performed by: STUDENT IN AN ORGANIZED HEALTH CARE EDUCATION/TRAINING PROGRAM

## 2025-07-28 PROCEDURE — 76813 OB US NUCHAL MEAS 1 GEST: CPT

## 2025-08-15 ENCOUNTER — APPOINTMENT (OUTPATIENT)
Dept: OBSTETRICS AND GYNECOLOGY | Facility: CLINIC | Age: 25
End: 2025-08-15
Payer: COMMERCIAL

## 2025-08-15 ENCOUNTER — APPOINTMENT (OUTPATIENT)
Dept: OBSTETRICS AND GYNECOLOGY | Facility: HOSPITAL | Age: 25
End: 2025-08-15
Payer: COMMERCIAL

## 2025-08-15 ENCOUNTER — LAB (OUTPATIENT)
Dept: LAB | Facility: HOSPITAL | Age: 25
End: 2025-08-15
Payer: COMMERCIAL

## 2025-08-15 VITALS — SYSTOLIC BLOOD PRESSURE: 131 MMHG | BODY MASS INDEX: 26.75 KG/M2 | DIASTOLIC BLOOD PRESSURE: 81 MMHG | WEIGHT: 151 LBS

## 2025-08-15 DIAGNOSIS — Z34.92 ENCOUNTER FOR PREGNANCY RELATED EXAMINATION IN SECOND TRIMESTER: ICD-10-CM

## 2025-08-15 DIAGNOSIS — Z3A.10 10 WEEKS GESTATION OF PREGNANCY (HHS-HCC): Primary | ICD-10-CM

## 2025-08-15 LAB
ABO GROUP (TYPE) IN BLOOD: NORMAL
ANTIBODY SCREEN: NORMAL
ERYTHROCYTE [DISTWIDTH] IN BLOOD BY AUTOMATED COUNT: 14.4 % (ref 11.5–14.5)
HCT VFR BLD AUTO: 41.2 % (ref 36–46)
HGB BLD-MCNC: 12.7 G/DL (ref 12–16)
MCH RBC QN AUTO: 24.6 PG (ref 26–34)
MCHC RBC AUTO-ENTMCNC: 30.8 G/DL (ref 32–36)
MCV RBC AUTO: 80 FL (ref 80–100)
NRBC BLD-RTO: 0 /100 WBCS (ref 0–0)
PLATELET # BLD AUTO: 329 X10*3/UL (ref 150–450)
RBC # BLD AUTO: 5.16 X10*6/UL (ref 4–5.2)
REFLEX ADDED, ANEMIA PANEL: NORMAL
RH FACTOR (ANTIGEN D): NORMAL
WBC # BLD AUTO: 10.8 X10*3/UL (ref 4.4–11.3)

## 2025-08-15 PROCEDURE — 85027 COMPLETE CBC AUTOMATED: CPT

## 2025-08-15 PROCEDURE — 99212 OFFICE O/P EST SF 10 MIN: CPT

## 2025-08-15 PROCEDURE — 86900 BLOOD TYPING SEROLOGIC ABO: CPT

## 2025-08-15 PROCEDURE — 86901 BLOOD TYPING SEROLOGIC RH(D): CPT

## 2025-08-15 PROCEDURE — 36415 COLL VENOUS BLD VENIPUNCTURE: CPT

## 2025-08-15 PROCEDURE — 86850 RBC ANTIBODY SCREEN: CPT

## 2025-08-15 PROCEDURE — 0501F PRENATAL FLOW SHEET: CPT | Performed by: NURSE PRACTITIONER

## 2025-08-16 LAB
EST. AVERAGE GLUCOSE BLD GHB EST-MCNC: 111 MG/DL
EST. AVERAGE GLUCOSE BLD GHB EST-SCNC: 6.2 MMOL/L
HBA1C MFR BLD: 5.5 %
HBV CORE AB SERPL QL IA: NORMAL
HBV SURFACE AB SERPL IA-ACNC: <5 MIU/ML
HBV SURFACE AG SERPL QL IA: NORMAL
HCV AB SERPL QL IA: NORMAL
HIV 1+2 AB+HIV1 P24 AG SERPL QL IA: NORMAL
HIV 1+2 AB+HIV1 P24 AG SERPL QL IA: NORMAL
RUBV IGG SERPL IA-ACNC: NORMAL
T PALLIDUM AB SER QL IA: NORMAL

## 2025-08-18 LAB
HEMOGLOBIN A2: 2.4 % (ref 2–3.5)
HEMOGLOBIN A: 97.3 % (ref 95.8–98)
HEMOGLOBIN F: 0.3 % (ref 0–2)
HEMOGLOBIN IDENTIFICATION INTERPRETATION: NORMAL
PATH REVIEW-HGB IDENTIFICATION: NORMAL

## 2025-08-20 LAB
EST. AVERAGE GLUCOSE BLD GHB EST-MCNC: 111 MG/DL
EST. AVERAGE GLUCOSE BLD GHB EST-SCNC: 6.2 MMOL/L
HBA1C MFR BLD: 5.5 %
HBV CORE AB SERPL QL IA: NORMAL
HBV SURFACE AB SERPL IA-ACNC: <5 MIU/ML
HBV SURFACE AG SERPL QL IA: NORMAL
HCV AB SERPL QL IA: NORMAL
HIV 1+2 AB+HIV1 P24 AG SERPL QL IA: NORMAL
HIV 1+2 AB+HIV1 P24 AG SERPL QL IA: NORMAL
RUBV IGG SERPL IA-ACNC: 7.88 INDEX
T PALLIDUM AB SER QL IA: NEGATIVE

## 2025-08-26 LAB
COMMENTS - MP RESULT TYPE: NORMAL
SCAN RESULT: NORMAL

## 2025-09-03 LAB
COMMENTS - MP RESULT TYPE: NORMAL
SCAN RESULT: NORMAL

## 2025-09-04 ENCOUNTER — HOSPITAL ENCOUNTER (OUTPATIENT)
Dept: RADIOLOGY | Facility: CLINIC | Age: 25
End: 2025-09-04
Payer: COMMERCIAL

## 2025-09-12 ENCOUNTER — APPOINTMENT (OUTPATIENT)
Dept: OBSTETRICS AND GYNECOLOGY | Facility: HOSPITAL | Age: 25
End: 2025-09-12
Payer: COMMERCIAL